# Patient Record
(demographics unavailable — no encounter records)

---

## 2024-12-06 NOTE — CONSULT LETTER
[Dear  ___] : Dear  [unfilled], [Consult Letter:] : I had the pleasure of evaluating your patient, [unfilled]. [Please see my note below.] : Please see my note below. [Consult Closing:] : Thank you very much for allowing me to participate in the care of this patient.  If you have any questions, please do not hesitate to contact me. [Sincerely,] : Sincerely, [FreeTextEntry3] : Carlyn Clifford MD [facility] Division of Pediatric Endocrinology  [facility] at [facility]

## 2024-12-06 NOTE — ASSESSMENT
[FreeTextEntry1] : Yaa is 6 years 3-month-old girl, referred for evaluation of her growth. Her height is at the 1st percentile as well as her BMI. I discussed with her mother and explained that it is unclear if Yaa's height percentiles decelerated because I did not have her growth charts at the visit (and now based on her growth chart it is still unclear, even though it seem she grew on the same percentile in the past year). We discussed about Yaa's low weight and BMI, and I explained the intake should be optimized in order to support growth. Mom wanted to wait with blood work evaluation for underlying conditions that can cause growth deceleration, short stature and low joey. They will meet the nutritionist and try to work on her caloric intake. I also discussed with mom about SGA with no growth catch up as an indication for GH treatment. I will see Yaa again in 4 months and based on her growth velocity, we will discuss the next step.

## 2024-12-06 NOTE — PAST MEDICAL HISTORY
[At Term] : at term [ Section] : by  section [None] : there were no delivery complications [Age Appropriate] : age appropriate developmental milestones met [FreeTextEntry1] : 4 pounds 5 ounces; 1st percentile, -3.465 SD

## 2024-12-06 NOTE — FAMILY HISTORY
[___ inches] : [unfilled] inches [FreeTextEntry5] : 13YO [FreeTextEntry4] : PGM- 56", PGF 67", MGM- 60", MGF 68" [FreeTextEntry2] : 3YO sister, brother 12YO

## 2024-12-06 NOTE — PHYSICAL EXAM
[Healthy Appearing] : healthy appearing [Normal Appearance] : normal appearance [Well formed] : well formed [Normal S1 and S2] : normal S1 and S2 [Clear to Ausculation Bilaterally] : clear to auscultation bilaterally [Abdomen Soft] : soft [1] : was Wesly stage 1 [Wesly Stage ___] : the Wesly stage for breast development was [unfilled] [Normal] : grossly intact [de-identified] : prepubertal

## 2024-12-06 NOTE — HISTORY OF PRESENT ILLNESS
[Abdominal Pain] : abdominal pain [Premenarchal] : premenarchal [Headaches] : no headaches [Constipation] : no constipation [Cold Intolerance] : no cold intolerance [Heat Intolerance] : no heat intolerance [Fatigue] : no fatigue [Weakness] : no weakness [Anorexia] : no anorexia [Weight Loss] : no weight loss [FreeTextEntry2] : Yaa is 6 years 3-month-old girl, referred for evaluation of her growth. Mom reports she is not very concerned as Yaa was born small and was always small compared to her peers. Review of her growth chart (which I reviewed after the visit because I did not have it at the visit) shows stable growth in the past year, around the 1st percentile. Prior to that it seems like it was higher, but it is not very consistent. Weight percentiles were always very low. Bone age study was also done and was read as 5 years at the chronologic age of 6 years. I agree with the reading.   Her mother reports she eats very small amounts and is very active. She sometimes has growing pains in her thighs- comes and goes, not always at the same place.

## 2024-12-06 NOTE — CONSULT LETTER
[Dear  ___] : Dear  [unfilled], [Consult Letter:] : I had the pleasure of evaluating your patient, [unfilled]. [Please see my note below.] : Please see my note below. [Consult Closing:] : Thank you very much for allowing me to participate in the care of this patient.  If you have any questions, please do not hesitate to contact me. [Sincerely,] : Sincerely, [FreeTextEntry3] : Carlyn Clifford MD [facility] Division of Pediatric Endocrinology  [facility] at [facility]

## 2024-12-06 NOTE — PHYSICAL EXAM
[Healthy Appearing] : healthy appearing [Normal Appearance] : normal appearance [Well formed] : well formed [Normal S1 and S2] : normal S1 and S2 [Clear to Ausculation Bilaterally] : clear to auscultation bilaterally [Abdomen Soft] : soft [1] : was Wesly stage 1 [Wesly Stage ___] : the Wesly stage for breast development was [unfilled] [Normal] : grossly intact [de-identified] : prepubertal

## 2025-05-08 NOTE — PHYSICAL EXAM
[Healthy Appearing] : healthy appearing [Normal Appearance] : normal appearance [Well formed] : well formed [Normal S1 and S2] : normal S1 and S2 [Clear to Ausculation Bilaterally] : clear to auscultation bilaterally [Abdomen Soft] : soft [Normal] : grossly intact [de-identified] : pre pubertal

## 2025-05-08 NOTE — ASSESSMENT
[FreeTextEntry1] :  Yaa is 6 years 8-month-old girl, follows here for her growth. Today she is at the 1st percentile for height and weight, and 4th percentile for BMI (was 1st percentile at the last visit). She grew at a normal prepubertal rate of 5.21 cm/yr. Since she did not gain weight since January, and she also on a low height percentile, I recommended to complete screening blood work to rule out conditions that can interfere with growth and weight gain, as well as chromosomal analysis. I will call them with the results. I explained that if everything is normal, I will follow up her growth in 6 months.

## 2025-05-08 NOTE — CONSULT LETTER
[Dear  ___] : Dear  [unfilled], [Courtesy Letter:] : I had the pleasure of seeing your patient, [unfilled], in my office today. [Please see my note below.] : Please see my note below. [Consult Closing:] : Thank you very much for allowing me to participate in the care of this patient.  If you have any questions, please do not hesitate to contact me. [Sincerely,] : Sincerely, [FreeTextEntry3] : Carlyn Clifford MD [facility] Division of Pediatric Endocrinology  [facility]

## 2025-05-08 NOTE — HISTORY OF PRESENT ILLNESS
[Premenarchal] : premenarchal [Headaches] : no headaches [Knee Pain] : no knee pain [Hip Pain] : no hip pain [Constipation] : no constipation [Cold Intolerance] : no cold intolerance [Heat Intolerance] : no heat intolerance [Fatigue] : no fatigue [Weakness] : no weakness [Anorexia] : no anorexia [Abdominal Pain] : no abdominal pain [FreeTextEntry2] : Yaa is 6 years 8-month-old girl, follows here for her growth. She was initially seen in 12/2024. At the visit, mom reported she is not very concerned as Yaa was born small and was always small compared to her peers. Review of her growth chart (which I reviewed after the visit because I did not have it at the visit) shows stable growth in the prior year, around the 1st percentile. Prior to that it seems like it was higher, but it is not very consistent. Weight percentiles were always very low. Bone age study was also done and was read as 5 years at the chronologic age of 6 years. I agree with the reading. Her mother reported she eats very small amounts and is very active. She sometimes has growing pains in her thighs that comes and goes, not always at the same place.   She is here today for follow up. Mom reports she is very active but eating more. She met the nutritionist in 1/2025.